# Patient Record
Sex: FEMALE | Race: BLACK OR AFRICAN AMERICAN | URBAN - METROPOLITAN AREA
[De-identification: names, ages, dates, MRNs, and addresses within clinical notes are randomized per-mention and may not be internally consistent; named-entity substitution may affect disease eponyms.]

---

## 2017-03-09 ENCOUNTER — OFFICE VISIT (OUTPATIENT)
Dept: FAMILY MEDICINE | Facility: CLINIC | Age: 22
End: 2017-03-09

## 2017-03-09 VITALS
DIASTOLIC BLOOD PRESSURE: 70 MMHG | BODY MASS INDEX: 23.92 KG/M2 | TEMPERATURE: 97.7 F | HEIGHT: 64 IN | SYSTOLIC BLOOD PRESSURE: 124 MMHG | HEART RATE: 62 BPM | WEIGHT: 140.1 LBS | RESPIRATION RATE: 16 BRPM | OXYGEN SATURATION: 99 %

## 2017-03-09 DIAGNOSIS — N89.8 LEUKORRHEA: Primary | ICD-10-CM

## 2017-03-09 DIAGNOSIS — K59.02 CONSTIPATION DUE TO OUTLET SYNDROME: ICD-10-CM

## 2017-03-09 LAB
ALBUMIN UR-MCNC: NEGATIVE MG/DL
APPEARANCE UR: CLEAR
BILIRUB UR QL STRIP: NEGATIVE
COLOR UR AUTO: NORMAL
GLUCOSE UR STRIP-MCNC: NEGATIVE MG/DL
HGB UR QL STRIP: NEGATIVE
KETONES UR STRIP-MCNC: NEGATIVE MG/DL
LEUKOCYTE ESTERASE UR QL STRIP: NEGATIVE
MICRO REPORT STATUS: NORMAL
NITRATE UR QL: NEGATIVE
PH UR STRIP: 5 PH (ref 5–7)
SP GR UR STRIP: 1 (ref 1–1.03)
SPECIMEN SOURCE: NORMAL
URN SPEC COLLECT METH UR: NORMAL
UROBILINOGEN UR STRIP-MCNC: NORMAL MG/DL (ref 0–2)
WET PREP SPEC: NORMAL

## 2017-03-09 RX ORDER — POLYETHYLENE GLYCOL 3350
POWDER (GRAM) MISCELLANEOUS
Qty: 255 G | Refills: 0 | Status: SHIPPED | OUTPATIENT
Start: 2017-03-09 | End: 2017-05-11

## 2017-03-09 NOTE — MR AVS SNAPSHOT
After Visit Summary   3/9/2017    Arlene Mckeon    MRN: [id number]           Patient Information     Date Of Birth          1995        Visit Information        Provider Department      3/9/2017 7:00 PM Clinician, Colleen Sanchez [facility]        Today's Diagnoses     Leukorrhea    -  1    Constipation due to outlet syndrome          Care Instructions    Patient Visit Summary    Patient Name: Arlene Mckeon  MRN: [id number]  Date of Visit: 3/9/2017  Diagnosis: Constipation    Instructions:   - Mix the MiraLax powder with water and drink it until your constipation improves.   - Mélanger la poudre de MiraLax avec de l'eau et la boire jusqu'à ce que votre constipation s'améliore.    - Try to drink 6-8 cups of water every day (even if you are not thirsty).  - Essayez de boire 6-8 tasses d'eau tous les jours (même si vous n'avez pas soif).    - Try to eat fruits and vegetables every day.   - Essayez de manger pema fruits et pema légumes tous les jours.     - Your runny nose should improve as the weather gets warmer.   - Votre lori ashish coule devrait s'améliorer à mesure que le temps se réchauffe.    - We will call Said with the urine and genital test results. If you need medication for an infection, we will have you come back into clinic for those.   - Nous allons téléphone Said avec les résultats pema test urinaire et génital. Si vous avez besoin de médicaments pour une infection, nous vous ferons revenir à la clinique pour ceux.    Physician: Dr. Adams        Follow-ups after your visit        Who to contact     Please call your clinic at [phone] to:    Ask questions about your health    Make or cancel appointments    Discuss your medicines    Learn about your test results    Speak to your doctor   If you have compliments or concerns about an experience at your clinic, or if you wish to file a complaint, please contact [facility] Patient Relations at [phone] or  "email us at [email or web address]         Additional Information About Your Visit        XD NutritionharPlaceIQ Information     Giant Swarm is an electronic gateway that provides easy, online access to your medical records. With Giant Swarm, you can request a clinic appointment, read your test results, renew a prescription or communicate with your care team.     To sign up for Giant Swarm visit the website at [email or web address]   You will be asked to enter the access code listed below, as well as some personal information. Please follow the directions to create your username and password.     Your access code is: [email or web address]  Expires: 2017  9:02 PM     Your access code will  in 90 days. If you need help or a new code, please contact your [facility] or call [phone] for assistance.        Care EveryWhere ID     This is your Care EveryWhere ID. This could be used by other organizations to access your [place] medical records  [id number]        Your Vitals Were     Pulse Temperature Respirations Height Pulse Oximetry BMI (Body Mass Index)    62 97.7  F (36.5  C) (Oral) 16 5' 4\" (162.6 cm) 99% 24.05 kg/m2       Blood Pressure from Last 3 Encounters:   17 124/70    Weight from Last 3 Encounters:   17 140 lb 1.6 oz (63.5 kg)              We Performed the Following     *UA relfex to Microscopic     Wet prep          Today's Medication Changes          These changes are accurate as of: 3/9/17  9:04 PM.  If you have any questions, ask your nurse or doctor.               Start taking these medicines.        Dose/Directions    polyethylene glycol 3350 Powd   Used for:  Constipation due to outlet syndrome   Started by:  Clinician, Pn Ump        Dissolve 1 capful (17 grams) in an 8 oz glass of water or juice and take by mouth once daily as needed for constipation.   Quantity:  255 g   Refills:  0            Where to get your medicines      Some of these will need a paper " prescription and others can be bought over the counter.  Ask your nurse if you have questions.     Bring a paper prescription for each of these medications     polyethylene glycol 3350 Powd                Primary Care Provider    None Specified       No primary provider on file.        Thank you!     Thank you for choosing [facility]  for your care. Our goal is always to provide you with excellent care. Hearing back from our patients is one way we can continue to improve our services. Please take a few minutes to complete the written survey that you may receive in the mail after your visit with us. Thank you!             Your Updated Medication List - Protect others around you: Learn how to safely use, store and throw away your medicines at www.disposemymeds.org.          This list is accurate as of: 3/9/17  9:04 PM.  Always use your most recent med list.                   Brand Name Dispense Instructions for use    polyethylene glycol 3350 Powd     255 g    Dissolve 1 capful (17 grams) in an 8 oz glass of water or juice and take by mouth once daily as needed for constipation.

## 2017-03-10 ENCOUNTER — TELEPHONE (OUTPATIENT)
Dept: FAMILY MEDICINE | Facility: CLINIC | Age: 22
End: 2017-03-10

## 2017-03-10 NOTE — PROGRESS NOTES
"MEDICINE NOTE    SUBJECTIVE:  CHRIST is a 21 year old female who presents with constipation for the last 2 weeks, when she moved here from [facility]. She reports no stool over this time until last night and today when she passed small stools with straining and pain, after eating dried prunes. She admits decreased water intake (< 3 cups/day) since moving to the  due to the much colder weather and not feeling thirsty. She reports mild weight loss, fatigue, weakness, dark yellow urine, one bout of vomiting 3 days ago, and abdominal \"bloating\" but denies fever, diarrhea, abdominal pain, and lightheadedness.     CHRIST also described 6 months of white vaginal discharge following birth of her first child at that time. Originally she reported erythema and pruritis with the discharge but administration of an unkown 1x/day vaginal suppository for 6 days while still in [facility] [facility] alleviated the erythema/pruritis. Patient denies dysuria. She previously did not use a physical barrier with intercourse and did not have mutliple sexual parners prior to delivery of her child; having not had intercourse since then. She gave birth via uncomplicated spontaneous vaginal delivery to a healthy baby and is currently breast feeding.     CHRIST has moved in with her extended family in the area since coming to the . Her uncle Vincent was with her in clinic and translated for the patient.       REVIEW OF SYSTEMS:  Gen: no fevers, mild weight loss  Eyes: no vision change  Cardiac: no chest tightness  Lungs: no dyspnea, cough, or shortness of breath  GI: See HPI  : See HPI    No past medical history on file.    No past surgical history on file.    No family history on file.    Social History     Social History     Marital status: Single     Spouse name: N/A     Number of children: N/A     Years of education: N/A     Occupational History     Not on file.     Social History Main Topics     Smoking status: Not on file     Smokeless tobacco: Not on file     " "Alcohol use No     Drug use: No     Sexual activity: Not on file     Other Topics Concern     Not on file     Social History Narrative    Date of Service:3/9/2017     Name: Arlene HENDERSON (Month, Day, Year of birth): 1995     MRN: [id number]     New Patient:               Preferred Language: [place] Islander, Slovenian     Needed: YES (came with  who spoke English)     County of Residence: [place]    Marital Status: Single    Household size: 4    Number of Dependents: 0    Pregnant: No     (YES / NO Answer required)    Average Monthly Income: $     Citizenship Status: Visa     Notes on Assistance Programs:        3/9/2017: Spoke with  Said about Arlene's desire to get insured and address some questions about immigration law. She has been in the states for 2 [place], and thinks [place] is cold [place]. Referred them to [employer] main help line, and directed them to [facility] for Navigator services including MNSure and MA options. Said left his phone number for us to pass to Law, notified them that  are legal nights at [facility].        OBJECTIVE:  Physical Exam:  /70 (BP Location: Left arm, Patient Position: Chair)  Pulse 62  Temp 97.7  F (36.5  C) (Oral)  Resp 16  Ht 5' 4\" (162.6 cm)  Wt 140 lb 1.6 oz (63.5 kg)  SpO2 99%  BMI 24.05 kg/m2  Constitutional: no distress, comfortable, pleasant   Eyes: anicteric, normal extra-ocular movements .   Cardiovascular: regular rate and rhythm, normal S1 and S2, no murmurs, rubs or gallops   Respiratory: clear to auscultation, no wheezes or crackles, normal breath sounds   Gastrointestinal: positive bowel sounds, nontender, no hepatosplenomegaly, no masses    Skin: no jaundice   Psychological: appropriate mood       ASSESSMENT/PLAN:  Arlene was seen today for constipation.    Diagnoses and all orders for this visit:    Leukorrhea  -     *UA relfex to Microscopic  -     Wet prep    Constipation due to outlet syndrome  -     " "polyethylene glycol 3350 POWD; Dissolve 1 capful (17 grams) in an 8 oz glass of water or juice and take by mouth once daily as needed for constipation.    Note: Epic required qualifier with constipation diagnosis. Please disregard \"outlet syndrome.\"    Leukorrhea:  UA and wet prep of vaginal secretions were ordered because no rapid yeast test is available at clinic. No discharge was seen at time of collection but swab of area was completed. Results will be relayed to the patient through her uncle Vincent as the  (phone number: [phone]). If positive for yeast infection, she will be asked to return to clinic to receive treatment, such as miconazole.     Constipation:  Polyethylene glycol (MiraLAX) to be used prn to relieve constipation. Patient was also counseled on increased fluid consumption even when not thirsty and appropriate type of foods to improve stool bulk.       Med Clinician: Cameron Jansen  Preceptor:Lazaro Shay    In supervising the medical student, I repeated the exam documented above.  I have reviewed and verified the student s documentation.  Supervising Provider: Lazaro Shay      "

## 2017-03-10 NOTE — PROGRESS NOTES
3/9/2017: Spoke with  Said about Arlene's desire to get insured and address some questions about immigration law. She has been in the states [place] 2 [place], [place]. Referred them to [facility] main help line, and directed them to [facility] for Navigator services including MNSure and MA options. Said left his phone number for us to pass to Law, notified them that mondays are legal nights at [facility]. PZC, SS

## 2017-03-10 NOTE — PATIENT INSTRUCTIONS
Patient Visit Summary    Patient Name: Arlene Mckeon  MRN: [id number]  Date of Visit: 3/9/2017  Diagnosis: Constipation    Instructions:   - Mix the MiraLax powder with water and drink it until your constipation improves.   - Mélanger la poudre de MiraLax avec de l'eau et la boire jusqu'à ce que votre constipation s'améliore.    - Try to drink 6-8 cups of water every day (even if you are not thirsty).  - Essayez de boire 6-8 tasses d'eau tous les jours (même si vous n'avez pas soif).    - Try to eat fruits and vegetables every day.   - Essayez de manger pema fruits et pema légumes tous les jours.     - Your runny nose should improve as the weather gets warmer.   - Votre lori ashish coule devrait s'améliorer à mesure que le temps se réchauffe.    - We will call Said with the urine and genital test results. If you need medication for an infection, we will have you come back into clinic for those.   - Nous allons téléphone Said avec les résultats pema test urinaire et génital. Si vous avez besoin de médicaments pour une infection, nous vous ferons revenir à la clinique pour ceux.    Physician: Dr. Adams

## 2017-03-10 NOTE — NURSING NOTE
Patient moved to [place] two weeks ago from [facility]. Upper sorbian is primary language. Family member served as .     Chief complaint of constipation. Minimal bowel movements since moving to the [place] Had BM last night and one this morning, otherwise no other BM reported for the past two weeks. States flatulence is present. Took prune juice in attempt to treat constipation; denies taking medications for condition. Denies abdominal pain. Denies problems with urination. States urine is yellow. Pt claims she has no problem eating. Has not been drinking water because she has not felt thirsty. Reports increased eructation. One occurrence of vomiting three days ago; denies present nausea.    Cough and runny nose present for three days. Cough is reported to be nonproductive. Denies SOB or changes in heart rhythm. She is not taking medication for these conditions.    Vaginal discharge reported by patient. Began after her pregnancy in September 2016. Reports discharge is white and occurs daily. Denies redness, swelling, or itchiness. States she has applied topical ointment for treatment; unknown product name or frequency of use.

## 2017-03-10 NOTE — TELEPHONE ENCOUNTER
After discussion with Dr. Adams, I spoke with Said on the phone about Arlene's lab results. UA was negative and showed dilute urine, indicating dehydration as the cause unlikely. As it was likely not a clean catch and vaginal contamination is probable, the lack of leukocyte esterase, proteinuria, etc in urine sample mitigates the likelihood of moniliasis or other inflammatory vaginitis. Wet mount was unable to be tested per lab. Instructed Said to tell Arlene to use the MiraLAX prn to alleviate constipation and also monitor the vaginal symptoms. If one or both do not resolve, patient should return to clinic in the next couple weeks.

## 2017-03-12 NOTE — PROGRESS NOTES
"[facility] Progress Note     Chief complaint: Patient  IM  is experiencing constipation and potential vaginal infection.    Subjective: Patient is a 21 year old female with constipation and vaginal discharge. Patient claims to not use alcohol, caffeine, tobacco products, or illicit drugs. Patient came to [place] from [facility] two weeks ago and has been experiencing symptoms of constipation since the travel. Patient claims to have been drinking less water than normal. Patient vomited three days ago, has been burping often, and has been having a lot of gas. Patient states that she has no abdominal pain and was able to have a small bowel movement yesterday. Patient also states that she has lost a little weight recently. She has recently tried eating prunes to help with the constipation and has had minor effectiveness from this.  Patient complains of possible genital infection with white vaginal discharge. She is not experiencing swelling or itching. Patient is currently breastfeeding so would want to avoid a medication that could interact with this.  Condition 1: Constipation   Condition 2: Possible vaginal Infection    Current Outpatient Prescriptions   Medication Sig Dispense Refill     polyethylene glycol 3350 POWD Dissolve 1 capful (17 grams) in an 8 oz glass of water or juice and take by mouth once daily as needed for constipation. 255 g 0         Objective:   /70 (BP Location: Left arm, Patient Position: Chair)  Pulse 62  Temp 97.7  F (36.5  C) (Oral)  Resp 16  Ht 5' 4\" (162.6 cm)  Wt 140 lb 1.6 oz (63.5 kg)  SpO2 99%  BMI 24.05 kg/m2    Assessment:     Condition 1- Constipation  DTP: Needs Additional Therapy  Rationale: Patient would benefit from immediate relief of constipation using a glycerin suppository. We don't have this in [facility] pharmacy, but she may pick them up at [facility]. She and her friend are willing to stop at the pharmacy to purchase these. She would also benefit from having Miralax on hand " to use as needed once daily to relieve constipation. Patient may also benefit from nonpharmacologic treatment of increasing water intake and increasing fresh fruits and vegetables (fiber) intake.     Condition 2- Potential vaginal Infection  DTP: May need additional therapy  Rationale: A swab was taken in clinic to determine if patient has a yeast infection or other type of urogenital infection. From her signs and symptoms, a yeast nfection may be most likely. It was determined that if this is the case, she may benefit from fluconazole 150 mg taken once by mouth as this is safe to use during breast feeding per lactmed.         Plan:  1.Initiate glycerin suppository for initial treatment of constipation. Instruct on how to use a suppository and refer to diagrams on the box when purchasing at [facility] for needed extra instruction.   2. Initiate Miralax--17 grams (capful) dissolved in a liquid once daily as needed for constipation after the use of glycerin suppositories. If the constipation persists after several more days the patient will be recommended to see a physician again come back to the clinic for additional treatment.   3. Discuss the importance of drinking enough water and at least 5 servings of fresh fruits and vegetables per day to help support digestion in the long term.       Pharmacy Follow-Up Plan (Method, Date, Parameters):     The Med Student should follow up with the patient on Saturday to inform patient of lab results and assess a care plan for those results. If patient does have a yeast infection, because she is breast-feeding, we will recommend for her fluconazole 150mg tablet daily which she can come to the clinic to receive. Additionally, we will want to ensure the patient did choose to  the glycerin suppositories from a nearby pharmacy and check with the patient on how this treatment is going and see if it is effective in relieving symptoms of constipation and a safe option for her.  With this, if she did not decide to purchase the glycerin suppositories and she is still experiencing symptoms of constipation, she may come to the clinic to receive a different treatment options such and bisacodyl tablets and docusate. Also, pharm care will want to be checking with the patient Monday 3/13 to see if she has been using the polyethylene glycol to help with symptoms of constipation and see if this has been effective and safe for her.    PharmCare Clinician: Kana Rader (submitted by Bessy Lee)  Pharmacy Preceptor: Britni Holcomb PharmD    _____________________________  Preceptor Use Only:  In supervising the student, I have reviewed and verified the student's documentation and found it to be correct and complete.   Preceptor Signature: Britni Holcomb PharmD

## 2017-03-30 ENCOUNTER — OFFICE VISIT (OUTPATIENT)
Dept: FAMILY MEDICINE | Facility: CLINIC | Age: 22
End: 2017-03-30

## 2017-03-30 DIAGNOSIS — Z91.89 POOR ORAL HYGIENE: Primary | ICD-10-CM

## 2017-03-30 NOTE — MR AVS SNAPSHOT
After Visit Summary   3/30/2017    Arlene Mckeon    MRN: [id number]           Patient Information     Date Of Birth          1995        Visit Information        Provider Department      3/30/2017 7:00 PM Clinician, Colleen Sanchez [facility]        Today's Diagnoses     Poor oral hygiene    -  1       Follow-ups after your visit        Who to contact     Please call your clinic at [phone] to:    Ask questions about your health    Make or cancel appointments    Discuss your medicines    Learn about your test results    Speak to your doctor   If you have compliments or concerns about an experience at your clinic, or if you wish to file a complaint, please contact [facility] Patient Relations at [phone] or email us at [email or web address]         Additional Information About Your Visit        MyChart Information     Sponge is an electronic gateway that provides easy, online access to your medical records. With Sponge, you can request a clinic appointment, read your test results, renew a prescription or communicate with your care team.     To sign up for [employer] Beyondt visit the website at [email or web address]   You will be asked to enter the access code listed below, as well as some personal information. Please follow the directions to create your username and password.     Your access code is: [email or web address]  Expires: 2017  6:51 AM     Your access code will  in 90 days. If you need help or a new code, please contact your [facility] or call [phone] for assistance.        Care EveryWhere ID     This is your Care EveryWhere ID. This could be used by other organizations to access your [place] medical records  [id number]         Blood Pressure from Last 3 Encounters:   17 117/65   17 124/70    Weight from Last 3 Encounters:   17 146 lb 6.4 oz (66.4 kg)   17 140 lb 1.6 oz  (63.5 kg)              Today, you had the following     No orders found for display       Primary Care Provider    None Specified       No primary provider on file.        Equal Access to Services     MILTON QUIROZ : Hadii christine Thomas, luca hodgsonarunha, luzshayy popejameelnan davaloskassie, araceli rennyin hayaamisael davalosmichelle mann laBillychristophe paulie. So [facility] [phone].    ATENCIÓN: Si habla español, tiene a lin disposición servicios gratuitos de asistencia lingüística. Llame al [phone].    We comply with applicable federal civil rights laws and [place] laws. We do not discriminate on the basis of race, color, national origin, age, disability sex, sexual orientation or gender identity.            Thank you!     Thank you for choosing [facility]  for your care. Our goal is always to provide you with excellent care. Hearing back from our patients is one way we can continue to improve our services. Please take a few minutes to complete the written survey that you may receive in the mail after your visit with us. Thank you!             Your Updated Medication List - Protect others around you: Learn how to safely use, store and throw away your medicines at www.disposemymeds.org.      Notice  As of 3/30/2017 11:59 PM    You have not been prescribed any medications.

## 2017-03-31 NOTE — PROGRESS NOTES
Dental CC:   Arlene Mckeon, a 21 year old female presents with the following dental concerns: oral hygiene instructions, referral for services.  Patient did not speak English and her uncle translated for her in Vietnamese or Andorran.      Head and Neck Exam:   Extraoral findings: WNL      Intraoral Findings: mucosa WNL  Found staining on linguals of mandibular anteriors and a MI fracture 9.    Found no visual evidence periodontal disease.     Found no visual evidence of dental caries.     Explanation of positive findings:  Explained patient could have teeth cleaned at one of the free clinics or at a sliding scale clinic if they do not want to wait for an appointment.  She will likely need to bring her uncle with her to translate.  They may be able to fill 9 at [facility], but she would need a consult.    Educated on:    Proper oral hygiene: Yes   Need for preventative dental services: Yes   Importance of catching oral disease processes at earliest stages: No    All questions answered and referred patient to free clinics on list.    Additional notes for follow-up: None    Dental exam completed by: Amanda Ruiz

## 2017-05-11 ENCOUNTER — OFFICE VISIT (OUTPATIENT)
Dept: FAMILY MEDICINE | Facility: CLINIC | Age: 22
End: 2017-05-11

## 2017-05-11 VITALS
SYSTOLIC BLOOD PRESSURE: 117 MMHG | BODY MASS INDEX: 25.94 KG/M2 | DIASTOLIC BLOOD PRESSURE: 65 MMHG | OXYGEN SATURATION: 93 % | RESPIRATION RATE: 18 BRPM | WEIGHT: 146.4 LBS | HEIGHT: 63 IN | HEART RATE: 84 BPM

## 2017-05-11 DIAGNOSIS — B96.89 BACTERIAL VAGINOSIS: ICD-10-CM

## 2017-05-11 DIAGNOSIS — E07.9 DISORDER OF THYROID: ICD-10-CM

## 2017-05-11 DIAGNOSIS — N76.0 BACTERIAL VAGINOSIS: ICD-10-CM

## 2017-05-11 DIAGNOSIS — R35.0 URINARY FREQUENCY: ICD-10-CM

## 2017-05-11 DIAGNOSIS — D64.9 ANEMIA, UNSPECIFIED TYPE: Primary | ICD-10-CM

## 2017-05-11 DIAGNOSIS — N89.8 VAGINAL DISCHARGE: ICD-10-CM

## 2017-05-11 LAB
ALBUMIN UR-MCNC: NEGATIVE MG/DL
APPEARANCE UR: ABNORMAL
BACTERIA #/AREA URNS HPF: ABNORMAL /HPF
BILIRUB UR QL STRIP: NEGATIVE
COLOR UR AUTO: ABNORMAL
GLUCOSE UR STRIP-MCNC: NEGATIVE MG/DL
HGB BLD-MCNC: 12 G/DL (ref 11.7–15.7)
HGB UR QL STRIP: NEGATIVE
KETONES UR STRIP-MCNC: NEGATIVE MG/DL
LEUKOCYTE ESTERASE UR QL STRIP: NEGATIVE
MICRO REPORT STATUS: ABNORMAL
MUCOUS THREADS #/AREA URNS LPF: PRESENT /LPF
NITRATE UR QL: NEGATIVE
PH UR STRIP: 6 PH (ref 5–7)
RBC #/AREA URNS AUTO: 1 /HPF (ref 0–2)
SP GR UR STRIP: 1.01 (ref 1–1.03)
SPECIMEN SOURCE: ABNORMAL
SQUAMOUS #/AREA URNS AUTO: 9 /HPF (ref 0–1)
TSH SERPL DL<=0.005 MIU/L-ACNC: 3.8 MU/L (ref 0.4–4)
URN SPEC COLLECT METH UR: ABNORMAL
UROBILINOGEN UR STRIP-MCNC: NORMAL MG/DL (ref 0–2)
WBC #/AREA URNS AUTO: 2 /HPF (ref 0–2)
WET PREP SPEC: ABNORMAL

## 2017-05-11 RX ORDER — FERROUS SULFATE 325(65) MG
325 TABLET, DELAYED RELEASE (ENTERIC COATED) ORAL DAILY
Qty: 30 TABLET | Refills: 0 | Status: SHIPPED | OUTPATIENT
Start: 2017-05-11 | End: 2017-06-10

## 2017-05-11 ASSESSMENT — PAIN SCALES - GENERAL: PAINLEVEL: NO PAIN (0)

## 2017-05-11 NOTE — Clinical Note
I have completed my note, please review, add tie in statement and close encounter.   Thanks!   [email or web address] [phone]

## 2017-05-11 NOTE — MR AVS SNAPSHOT
After Visit Summary   5/11/2017    Arlene Mckeon    MRN: [id number]           Patient Information     Date Of Birth          1995        Visit Information        Provider Department      5/11/2017 7:15 PM Clinician, Colleen [facility]        Today's Diagnoses     Anemia, unspecified type    -  1    Disorder of thyroid        Urinary frequency        Vaginal discharge          Care Instructions    Patient Visit Summary    Patient Name: Arlene Mckeon  MRN: [id number]     Date of Visit: 5/11/2017    Principle Diagnosis: Anemia and Vaginisits    Physician's Recommendation instructions: We recommend taking one tablet of ferrous sulfate everyday with food for the anemia. This medication can cause constipation or stomach cramps. If you are experiencing either of these symptoms please feel free to give us a call. We will call to confirm that medication plan after we receive the lab results. Additionally, we will call with the results of the vaginal swab and sexually transmitted infection tests and prescribe medications based on those results. Finally, we will call with the results of the thyroid test.    Lab Tests Performed: Urine analysis, Vaginal swab, Thyroid blood test, Chlamydia and Gonnorhea tests    Follow Up/Results: We will call with the results of each of the tests.       Physician: Dr. Wen Chu        Follow-ups after your visit        Who to contact     Please call your clinic at [phone] to:    Ask questions about your health    Make or cancel appointments    Discuss your medicines    Learn about your test results    Speak to your doctor   If you have compliments or concerns about an experience at your clinic, or if you wish to file a complaint, please contact [facility] Patient Relations at [phone] or email us at [email or web address]         Additional Information About Your Visit        Victor Mhart Information     Eric is an  electronic gateway that provides easy, online access to your medical records. With [employer], you can request a clinic appointment, read your test results, renew a prescription or communicate with your care team.     To sign up for [employer] visit the website at [email or web address]   You will be asked to enter the access code listed below, as well as some personal information. Please follow the directions to create your username and password.     Your access code is: [email or web address]  Expires: 2017 10:02 PM     Your access code will  in 90 days. If you need help or a new code, please contact your [facility] or call [phone] for assistance.        Care EveryWhere ID     This is your Care EveryWhere ID. This could be used by other organizations to access your [facility] medical records  [id number]         Blood Pressure from Last 3 Encounters:   17 124/70    Weight from Last 3 Encounters:   17 140 lb 1.6 oz (63.5 kg)              We Performed the Following     CHLAMYDIA TRACHOMATIS PCR     Hemoglobin     NEISSERIA GONORRHOEA PCR     TSH with free T4 reflex     UA reflex to Microscopic and Culture     Wet prep          Today's Medication Changes          These changes are accurate as of: 17  8:56 PM.  If you have any questions, ask your nurse or doctor.               Start taking these medicines.        Dose/Directions    ferrous sulfate 325 (65 FE) MG Tbec EC tablet   Used for:  Anemia, unspecified type   Started by:  ClinicianColleen        Dose:  325 mg   Take 1 tablet (325 mg) by mouth daily   Quantity:  30 tablet   Refills:  0            Where to get your medicines      Some of these will need a paper prescription and others can be bought over the counter.  Ask your nurse if you have questions.     Bring a paper prescription for each of these medications     ferrous sulfate 325 (65 FE) MG Tbec EC tablet                Primary Care Provider    None  Specified       No primary provider on file.        Thank you!     Thank you for choosing [facility]  for your care. Our goal is always to provide you with excellent care. Hearing back from our patients is one way we can continue to improve our services. Please take a few minutes to complete the written survey that you may receive in the mail after your visit with us. Thank you!             Your Updated Medication List - Protect others around you: Learn how to safely use, store and throw away your medicines at www.disposemymeds.org.          This list is accurate as of: 5/11/17  8:56 PM.  Always use your most recent med list.                   Brand Name Dispense Instructions for use    ferrous sulfate 325 (65 FE) MG Tbec EC tablet     30 tablet    Take 1 tablet (325 mg) by mouth daily

## 2017-05-12 LAB
C TRACH DNA SPEC QL NAA+PROBE: NORMAL
N GONORRHOEA DNA SPEC QL NAA+PROBE: NORMAL
SPECIMEN SOURCE: NORMAL
SPECIMEN SOURCE: NORMAL

## 2017-05-12 NOTE — PROGRESS NOTES
"[facility] Progress Note    Chief complaint: Arlene came to clinic today to be assessed for fatigue, headache, and weakness.     Subjective:  Condition 1: Possible Anemia. Arlene has been experiencing extreme fatigue, headaches and weakness for the past week. She gave birth about 8 months ago, in [place]. Arlene states while pregnant and following birth she was diagnosed with anemia. She was given medication, but is unsure what this was. Her menstrual cycle has restarted; it is regular with normal flow. Her last period ended 10 days ago. Arlene states she eats chicken and fruit, but not many red meats or green, leafy vegetables. She drinks whole milk and has gained weight since being here from [place], she came to [place] in February.    Condition 2: Vaginal Discharge. Arlene has been having a white discharge from her vagina for quite some time. When she was here on 3/9/17, they did a vaginal swab which came back inconclusive. Today, she states she is still experiencing this and feels wet all the time. Arlene states it is painful to urinate and wipe.    Current Outpatient Prescriptions   Medication Sig Dispense Refill     ferrous sulfate 325 (65 FE) MG TBEC EC tablet Take 1 tablet (325 mg) by mouth daily 30 tablet 0         Objective:   /65 (BP Location: Right arm, Patient Position: Chair, Cuff Size: Adult Regular)  Pulse 84  Resp 18  Ht 5' 2.5\" (158.8 cm)  Wt 146 lb 6.4 oz (66.4 kg)  SpO2 93%  BMI 26.35 kg/m2    Assessment:     Condition 1- Possible anemia  DTP: Indication - needs additional drug therapy.  Rationale: Arlene is exhibiting symptoms of anemia. Also, with her previous history of anemia with her pregnancy, starting her on an iron supplement is indicated. Lab tests, including hemoglobin and TSH will be drawn at clinic [facility]. Depending on her hemoglobin level and results of the labs, the dose will be adjusted. If hemoglobin is less than 9 mg/dL, increase ferrous sulfate dose to 1 tablet 2-3 times " daily.     Condition 2- Vaginal discharge  DTP: No drug therapy problems at this time  Rationale: A urine analysis, gonorrhea, chlamydia and wet prep were done tonight to determine the cause of the vaginal discharge. Depending on the results of these labs, antibiotics may be needed.    Plan:  1. Initiate ferrous sulfate 325 mg; take one tablet by mouth once daily  2. Blood draw to test hemoglobin and TSH levels  3. Urine analysis, STD screening and wet prep ordered    Pharmacy Follow-Up Plan (Method, Date, Parameters): Follow-up with patient in one week via the phone to determine if the patients symptoms (fatigue, headaches and weakness) have improved. Also, during this follow-up, make sure Iftin is not experiencing any side effects from the iron including; stomach cramps and constipation. Tell patient to return to clinic if symptoms are not improving or if they get worse at any time. In one month she should return to clinic to have her hemoglobin rechecked.    PharmCare Clinician: Isaura Wills  Pharmacy Preceptor: Jose D Tanner    _____________________________  Preceptor Use Only:  In supervising the student, I have reviewed and verified the student's documentation and found it to be correct and complete.   Preceptor Signature: Jose D Tanner, PharmCarlD.

## 2017-05-12 NOTE — NURSING NOTE
Patient presented to clinic with fatigue, dizziness, headache and difficulty breathing for the past week. She also reports back pain that gets worse as the works around the house. Currently is not taking anything for symptoms or for pain. She gave birth 8 months ago in [place] and is wondering about vitamin supplements.

## 2017-05-12 NOTE — PROGRESS NOTES
"MEDICINE NOTE    SUBJECTIVE:  Arlene is a 21 year old female who presented to clinic with fatigue that began one week prior to visit.  She reported that she has been sleeping 8-10 hours per night but wakes up exhausted and feeling weak.  Patient has had associated headaches and difficulty breathing, which also began one week ago. Of note, patient has recently immigrated from [facility].  She had a vaginal delivery 8 months ago, but her child is still in [place].  She reported that she was told that she had anemia both during and after the pregnancy and took an unknown medication for treatment.  She also was told that she lost a large amount of blood during the delivery and was given additional blood by her healthcare team.  Since the pregnancy, she has had increased vaginal bleeding during menstruation.  She reported that she uses 5 pads per day during her period and that the period usually lasts one week.  Her last period ended 11 days prior to the visit.  When asked about her diet, she stated that she does not eat red meat or significant amounts of green leafy vegetables.      In addition, she came to clinic in March for constipation and vaginal discharge.  The patient reported that the constipation has resolved completely, but the white vaginal discharge persists.  She stated that her underware always feels wet.  She reported associated dysuria, increased frequency, but denied hematuria.  She also noted back pain and when asked about fevers she stated that she \"feels hot\".  She stated that she was interested in STI testing during her visit to clinic today.        She was accompanied by her uncle in clinic who served as her  during the visit.      REVIEW OF SYSTEMS:  Gen: no chills, weight gain attributed to increased diet, fatigue, weakness  Lungs: dyspnea present, no cough,   GI: no nausea or constipation  : urinary frequency, dysuria, no hematuria,   Skin: no concerning lesions or moles  Neuro: " "headache  Endo: no polyuria or polydipsia, no temperature intolerance  Allergy: no environmental or drug allergies  Psych: no depression or anxiety, no difficulty sleeping    History reviewed. No pertinent past medical history.    History reviewed. No pertinent surgical history.    History reviewed. No pertinent family history.    Social History     Social History     Marital status: Single     Spouse name: N/A     Number of children: N/A     Years of education: N/A     Occupational History     Not on file.     Social History Main Topics     Smoking status: Never Smoker     Smokeless tobacco: Not on file     Alcohol use No     Drug use: No     Sexual activity: Not on file     Other Topics Concern     Not on file     Social History Narrative    Date of Service:3/9/2017     Name: Arlene HENDERSON (Month, Day, Year of birth): 1995     MRN: [id number]     New Patient:               Preferred Language: Urdu, [place] Islander     Needed: YES (came with  who spoke English)     County of Residence: [place]    Marital Status: Single    Household size: 4    Number of Dependents: 0    Pregnant: No     (YES / NO Answer required)    Average Monthly Income: $     Citizenship Status: Visa     Notes on Assistance Programs:        3/9/2017: Spoke with  Said about Arlene's desire to get insured and address some questions about immigration law. She has been in the states for 2 weeks, and thinks [place]. Referred them to [employer] main help line, and directed them to [employer] [facility] for Navigator services including MNSure and [place] options. Said left his phone number for us to pass to Law, notified them that  are legal nights at [facility].        OBJECTIVE:  Physical Exam:  /65 (BP Location: Right arm, Patient Position: Chair, Cuff Size: Adult Regular)  Pulse 84  Resp 18  Ht 5' 2.5\" (158.8 cm)  Wt 146 lb 6.4 oz (66.4 kg)  SpO2 93%  BMI 26.35 kg/m2  Constitutional: no " distress, comfortable, pleasant   Ears, Nose and Throat:  neck supple with full range of motion, mild thyromegaly observed.   Cardiovascular: regular rate and rhythm, normal S1 and S2, no murmurs, rubs or gallops,  Respiratory: clear to auscultation, no wheezes or crackles, normal breath sounds   Musculoskeletal: full range of motion  Skin: no concerning lesions, no jaundice   Neurological: cranial nerves intact, normal strength and sensation,  Psychological: appropriate mood   : white discharge from cervix observed during pelvic exam, external genitalia is without lesions     ASSESSMENT/PLAN:  Arlene was seen today for sick and back pain.    Diagnoses and all orders for this visit:    Anemia, unspecified type  -     ferrous sulfate 325 (65 FE) MG TBEC EC tablet; Take 1 tablet (325 mg) by mouth daily  -     Hemoglobin    Disorder of thyroid  -     TSH with free T4 reflex    Urinary frequency  -     UA reflex to Microscopic and Culture    Vaginal discharge  -     Wet prep  -     NEISSERIA GONORRHOEA PCR  -     CHLAMYDIA TRACHOMATIS PCR    1. Fatigue: Ordered lab work to check hemoglobin levels for suspected anemia as well as TSH levels for possible thyroid condition.  Started on 325 mg ferrous sulfate daily. Will revise treatment plan based on blood work.       2. Vaginal discharge: Conducted cervical exam and obtained wet prep and Chlamydia and Gonorrhea swab.  Also ordered UA to test for potential UTI.  Treatment plan is dependent upon lab results.        Med Clinician: Juarez Bedolla MS1  Preceptor: Wen Chu MD     In supervising the medical student, I repeated the exam documented above.  I have reviewed and verified the student s documentation.  Supervising Provider: Isaura Chu M.D.  PGY-2, Family Medicine

## 2017-05-12 NOTE — PATIENT INSTRUCTIONS
Patient Visit Summary    Patient Name: Arlene Mckeon  MRN: [id number]     Date of Visit: 5/11/2017    Principle Diagnosis: Anemia and Vaginisits    Physician's Recommendation instructions: We recommend taking one tablet of ferrous sulfate everyday with food for the anemia. This medication can cause constipation or stomach cramps. If you are experiencing either of these symptoms please feel free to give us a call. We will call to confirm that medication plan after we receive the lab results. Additionally, we will call with the results of the vaginal swab and sexually transmitted infection tests and prescribe medications based on those results. Finally, we will call with the results of the thyroid test.    Lab Tests Performed: Urine analysis, Vaginal swab, Thyroid blood test, Chlamydia and Gonnorhea tests    Follow Up/Results: We will call with the results of each of the tests.       Physician: Dr. Wen Chu

## 2017-05-15 RX ORDER — METRONIDAZOLE 250 MG/1
500 TABLET ORAL 2 TIMES DAILY
Qty: 14 TABLET | Refills: 0 | Status: SHIPPED | OUTPATIENT
Start: 2017-05-15

## 2017-05-15 RX ORDER — METRONIDAZOLE 250 MG/1
250 TABLET ORAL 2 TIMES DAILY
Qty: 7 TABLET | Refills: 0 | Status: CANCELLED | OUTPATIENT
Start: 2017-05-15

## 2017-05-16 ENCOUNTER — TELEPHONE (OUTPATIENT)
Dept: FAMILY MEDICINE | Facility: CLINIC | Age: 22
End: 2017-05-16

## 2017-05-17 NOTE — TELEPHONE ENCOUNTER
Spoke with Said to go over test results and treatment plan.  He said that they would come to clinic on Thursday night to  the metronidazole prescribed to treat Bacterial Vaginosis.

## 2017-05-19 NOTE — PROGRESS NOTES
===================    Attestation Statement:    I have reviewed and agree with the pharmacy assessment and plan presented.    Jose D Tanner PharmD.    ===================

## 2018-03-30 ENCOUNTER — MEDICAL CORRESPONDENCE (OUTPATIENT)
Dept: HEALTH INFORMATION MANAGEMENT | Facility: CLINIC | Age: 23
End: 2018-03-30